# Patient Record
Sex: FEMALE | Race: BLACK OR AFRICAN AMERICAN | NOT HISPANIC OR LATINO | Employment: OTHER | RURAL
[De-identification: names, ages, dates, MRNs, and addresses within clinical notes are randomized per-mention and may not be internally consistent; named-entity substitution may affect disease eponyms.]

---

## 2017-05-19 ENCOUNTER — OFFICE VISIT (OUTPATIENT)
Dept: PODIATRY | Facility: CLINIC | Age: 57
End: 2017-05-19

## 2017-05-19 VITALS — WEIGHT: 198 LBS | HEIGHT: 64 IN | BODY MASS INDEX: 33.8 KG/M2

## 2017-05-19 DIAGNOSIS — M79.672 LEFT FOOT PAIN: Primary | ICD-10-CM

## 2017-05-19 DIAGNOSIS — L85.1 ACQUIRED KERATOSIS OF PLANTAR ASPECT OF FOOT: ICD-10-CM

## 2017-05-19 PROCEDURE — 99202 OFFICE O/P NEW SF 15 MIN: CPT | Performed by: PODIATRIST

## 2017-05-19 RX ORDER — AMLODIPINE BESYLATE AND BENAZEPRIL HYDROCHLORIDE 5; 10 MG/1; MG/1
1 CAPSULE ORAL DAILY
COMMUNITY

## 2017-11-29 ENCOUNTER — OFFICE VISIT (OUTPATIENT)
Dept: OBSTETRICS AND GYNECOLOGY | Facility: CLINIC | Age: 57
End: 2017-11-29

## 2017-11-29 VITALS
WEIGHT: 201 LBS | HEIGHT: 64 IN | BODY MASS INDEX: 34.31 KG/M2 | DIASTOLIC BLOOD PRESSURE: 91 MMHG | SYSTOLIC BLOOD PRESSURE: 132 MMHG

## 2017-11-29 DIAGNOSIS — Z12.31 ENCOUNTER FOR SCREENING MAMMOGRAM FOR BREAST CANCER: ICD-10-CM

## 2017-11-29 DIAGNOSIS — Z01.419 ENCOUNTER FOR WELL WOMAN EXAM WITH ROUTINE GYNECOLOGICAL EXAM: Primary | ICD-10-CM

## 2017-11-29 DIAGNOSIS — B37.31 YEAST VAGINITIS: ICD-10-CM

## 2017-11-29 PROBLEM — I10 HYPERTENSION: Chronic | Status: ACTIVE | Noted: 2017-11-29

## 2017-11-29 PROBLEM — E66.9 OBESITY (BMI 30.0-34.9): Chronic | Status: ACTIVE | Noted: 2017-11-29

## 2017-11-29 PROCEDURE — 87624 HPV HI-RISK TYP POOLED RSLT: CPT | Performed by: OBSTETRICS & GYNECOLOGY

## 2017-11-29 PROCEDURE — 88142 CYTOPATH C/V THIN LAYER: CPT | Performed by: OBSTETRICS & GYNECOLOGY

## 2017-11-29 PROCEDURE — 99386 PREV VISIT NEW AGE 40-64: CPT | Performed by: OBSTETRICS & GYNECOLOGY

## 2017-11-29 RX ORDER — FLUCONAZOLE 150 MG/1
TABLET ORAL
Qty: 2 TABLET | Refills: 11 | Status: SHIPPED | OUTPATIENT
Start: 2017-11-29 | End: 2019-05-03

## 2017-11-30 NOTE — PROGRESS NOTES
Dorothy Erickson is a 57 y.o. y/o female     Chief Complaint: Establish care, annual GYN exam and Pap smear.    HPI:  57-year-old  with one vaginal delivery, one elective , and one miscarriage.  She had a laparoscopic assisted vaginal hysterectomy without BSO in   She is here to establish care and for annual GYN exam and Pap smear.    She is a dentist in private practice.    She is .    She is a nonsmoker.    Past medical history significant for hypertension and obesity.    She does not have any GYN complaints.      She is not on any hormone replacement therapy.    I will request her medical records.    Review of Systems   Constitutional: Negative for activity change, appetite change, chills, diaphoresis, fatigue, fever and unexpected weight change.   Gastrointestinal: Negative for abdominal pain, constipation, diarrhea and nausea.   Genitourinary: Negative for difficulty urinating, dyspareunia, dysuria, pelvic pain, urgency, vaginal bleeding, vaginal discharge and vaginal pain.   Neurological: Negative for headaches.   Psychiatric/Behavioral: Negative for dysphoric mood. The patient is not nervous/anxious.    All other systems reviewed and are negative.     Breast ROS: negative    The following portions of the patient's history were reviewed and updated as appropriate: allergies, current medications, past family history, past medical history, past social history, past surgical history and problem list.    Allergies   Allergen Reactions   • Contrast Dye           Current Outpatient Prescriptions:   •  amLODIPine-benazepril (LOTREL 5-10) 5-10 MG per capsule, Take 1 capsule by mouth Daily., Disp: , Rfl:   •  fluconazole (DIFLUCAN) 150 MG tablet, Take one po today and take one po in 4 days., Disp: 2 tablet, Rfl: 11     The patient has a family history of   Family History   Problem Relation Age of Onset   • Colon cancer Mother         Past Medical History:   Diagnosis Date   • Hypertension    •  "Obesity (BMI 30.0-34.9) 2017        OB History      Para Term  AB Living    3 1   2 1    SAB TAB Ectopic Multiple Live Births    2               Social History     Social History   • Marital status:      Spouse name: N/A   • Number of children: N/A   • Years of education: N/A     Occupational History   • Not on file.     Social History Main Topics   • Smoking status: Never Smoker   • Smokeless tobacco: Never Used   • Alcohol use Yes      Comment: Wine 1 glass a day   • Drug use: No   • Sexual activity: No      Comment: Hysterectomy     Other Topics Concern   • Not on file     Social History Narrative        Past Surgical History:   Procedure Laterality Date   • CYST REMOVAL     • HERNIA REPAIR     • HYSTERECTOMY          Patient Active Problem List   Diagnosis   • Hypertension   • Obesity (BMI 30.0-34.9)        Documented Vitals    17 1322   BP: 132/91   Weight: 201 lb (91.2 kg)   Height: 64\" (162.6 cm)   PainSc: 0-No pain       Physical Exam   Constitutional: She is oriented to person, place, and time. She appears well-developed and well-nourished. No distress.   Obese black female weighing 201 pounds with BMI 34.5.   HENT:   Head: Normocephalic and atraumatic.   Eyes: Conjunctivae and EOM are normal. Pupils are equal, round, and reactive to light.   Neck: Normal range of motion. Neck supple. No JVD present. No tracheal deviation present. No thyromegaly present.   Cardiovascular: Normal rate, regular rhythm, normal heart sounds and intact distal pulses.  Exam reveals no gallop and no friction rub.    No murmur heard.  Pulmonary/Chest: Effort normal and breath sounds normal. No stridor. No respiratory distress. She has no wheezes. She has no rales. She exhibits no tenderness. Right breast exhibits no inverted nipple, no mass, no nipple discharge, no skin change and no tenderness. Left breast exhibits no inverted nipple, no mass, no nipple discharge, no skin change and no tenderness. " Breasts are symmetrical. There is no breast swelling.   Abdominal: Soft. Bowel sounds are normal. She exhibits no distension and no mass. There is no tenderness. There is no rebound and no guarding. No hernia. Hernia confirmed negative in the right inguinal area and confirmed negative in the left inguinal area.   Genitourinary: Rectal exam shows no external hemorrhoid, no internal hemorrhoid, no fissure, no mass, no tenderness, anal tone normal and guaiac negative stool. No breast tenderness, discharge or bleeding. No labial fusion. There is no rash, tenderness, lesion or injury on the right labia. There is no rash, tenderness, lesion or injury on the left labia. Right adnexum displays no mass, no tenderness and no fullness. Left adnexum displays no mass, no tenderness and no fullness. Vaginal discharge found.   Genitourinary Comments: Vaginal yeast infection.  Surgically absent uterus and cervix.  No adnexal masses palpated.  Pap smear of the vaginal cuff performed.     Musculoskeletal: Normal range of motion. She exhibits no edema, tenderness or deformity.   Lymphadenopathy:     She has no cervical adenopathy.        Right: No inguinal adenopathy present.        Left: No inguinal adenopathy present.   Neurological: She is alert and oriented to person, place, and time. She has normal reflexes. She displays normal reflexes. No cranial nerve deficit. She exhibits normal muscle tone. Coordination normal.   Skin: Skin is warm and dry. No rash noted. She is not diaphoretic. No erythema. No pallor.   Psychiatric: She has a normal mood and affect. Her behavior is normal. Judgment and thought content normal.   Nursing note and vitals reviewed.     Assessment        Diagnosis Plan   1. Encounter for well woman exam with routine gynecological exam  Liquid-based Pap Smear, Screening - ThinPrep Vial, Cervix   2. Encounter for screening mammogram for breast cancer  Mammo Screening Digital Tomosynthesis Bilateral With CAD   3.  Yeast vaginitis           Plan      1. Diflucan 150 mg by mouth today and repeat in 4 days.  2. Schedule mammogram.  3. Encouraged in diet and exercise.  4. Handouts on depression, hot flashes, exercise, and vitamin use.   5. Follow-up in 1 year.  Follow-up sooner as needed.            This document has been electronically signed by Sabas Matthews MD on November 29, 2017 8:17 PM

## 2017-12-01 LAB
LAB AP CASE REPORT: NORMAL
LAB AP GYN ADDITIONAL INFORMATION: NORMAL
Lab: NORMAL
PATH INTERP SPEC-IMP: NORMAL
STAT OF ADQ CVX/VAG CYTO-IMP: NORMAL

## 2017-12-05 LAB — HPV I/H RISK 4 DNA CVX QL PROBE+SIG AMP: NEGATIVE

## 2019-05-03 ENCOUNTER — OFFICE VISIT (OUTPATIENT)
Dept: GASTROENTEROLOGY | Facility: CLINIC | Age: 59
End: 2019-05-03

## 2019-05-03 VITALS
DIASTOLIC BLOOD PRESSURE: 92 MMHG | BODY MASS INDEX: 34.18 KG/M2 | HEART RATE: 86 BPM | HEIGHT: 64 IN | SYSTOLIC BLOOD PRESSURE: 148 MMHG | WEIGHT: 200.2 LBS

## 2019-05-03 DIAGNOSIS — Z80.0 FH: COLON CANCER: ICD-10-CM

## 2019-05-03 DIAGNOSIS — Z12.11 ENCOUNTER FOR SCREENING FOR MALIGNANT NEOPLASM OF COLON: Primary | ICD-10-CM

## 2019-05-03 PROCEDURE — S0285 CNSLT BEFORE SCREEN COLONOSC: HCPCS | Performed by: NURSE PRACTITIONER

## 2019-05-03 RX ORDER — SODIUM, POTASSIUM,MAG SULFATES 17.5-3.13G
1 SOLUTION, RECONSTITUTED, ORAL ORAL EVERY 12 HOURS
Qty: 2 BOTTLE | Refills: 0 | Status: ON HOLD | OUTPATIENT
Start: 2019-05-03 | End: 2019-06-07

## 2019-05-03 RX ORDER — DEXTROSE AND SODIUM CHLORIDE 5; .45 G/100ML; G/100ML
30 INJECTION, SOLUTION INTRAVENOUS CONTINUOUS PRN
Status: CANCELLED | OUTPATIENT
Start: 2019-06-07

## 2019-05-03 RX ORDER — DIPHENHYDRAMINE HCL 25 MG
25 CAPSULE ORAL EVERY 6 HOURS PRN
COMMUNITY
End: 2019-06-05

## 2019-05-03 RX ORDER — SACCHAROMYCES BOULARDII 250 MG
250 CAPSULE ORAL 2 TIMES DAILY
COMMUNITY
End: 2019-06-05

## 2019-05-03 NOTE — PROGRESS NOTES
Chief Complaint   Patient presents with   • Colon Cancer Screening       Subjective    Dorothy Erickson is a 58 y.o. female. she is here today for follow-up.    History of Present Illness  58-year-old female presents to discuss screening colonoscopy.  She denies any abdominal pain, nausea, vomiting or change within her bowel habits.  Has family history of colorectal cancer in her mother who was diagnosed in her 60s.  States she had normal screening colonoscopy about 10 years ago in [place] unable to review that result.  Reports her sister has had polyps.  Plan; schedule patient for screening colonoscopy for surveillance due to family history of colon cancer.        The following portions of the patient's history were reviewed and updated as appropriate:   Past Medical History:   Diagnosis Date   • Hypertension    • Obesity (BMI 30.0-34.9) 2017     Past Surgical History:   Procedure Laterality Date   • CYST REMOVAL     • HERNIA REPAIR     • HYSTERECTOMY       Family History   Problem Relation Age of Onset   • Colon cancer Mother      OB History      Para Term  AB Living    3 1 1   2 1    SAB TAB Ectopic Molar Multiple Live Births    2                  Prior to Admission medications    Medication Sig Start Date End Date Taking? Authorizing Provider   amLODIPine-benazepril (LOTREL 5-10) 5-10 MG per capsule Take 1 capsule by mouth Daily.   Yes Shelia Woods MD   diphenhydrAMINE (BENADRYL) 25 mg capsule Take 25 mg by mouth Every 6 (Six) Hours As Needed for Itching.   Yes Shelia Woods MD   Multiple Vitamins-Minerals (MULTIVITAMIN ADULT PO) multivitamin   Yes Shelia Woods MD   saccharomyces boulardii (FLORASTOR) 250 MG capsule Take 250 mg by mouth 2 (Two) Times a Day.   Yes Shelia Woods MD   fluconazole (DIFLUCAN) 150 MG tablet Take one po today and take one po in 4 days. 11/29/17 5/3/19  Sabas Matthews MD     Allergies   Allergen Reactions   • Contrast Dye      Social  "History     Socioeconomic History   • Marital status:      Spouse name: Not on file   • Number of children: Not on file   • Years of education: Not on file   • Highest education level: Not on file   Tobacco Use   • Smoking status: Never Smoker   • Smokeless tobacco: Never Used   Substance and Sexual Activity   • Alcohol use: Yes     Comment: Wine 1 glass a day   • Drug use: No   • Sexual activity: No     Birth control/protection: None     Comment: Hysterectomy       Review of Systems  Review of Systems   Constitutional: Negative for activity change, appetite change, chills, diaphoresis, fatigue, fever and unexpected weight change.   HENT: Negative for sore throat and trouble swallowing.    Respiratory: Negative for shortness of breath.    Gastrointestinal: Negative for abdominal distention, abdominal pain, anal bleeding, blood in stool, constipation, diarrhea, nausea, rectal pain and vomiting.   Musculoskeletal: Negative for arthralgias.   Skin: Negative for pallor.   Neurological: Negative for light-headedness.        /92 (BP Location: Left arm)   Pulse 86   Ht 162.6 cm (64\")   Wt 90.8 kg (200 lb 3.2 oz)   LMP  (LMP Unknown) Comment: 1993  BMI 34.36 kg/m²     Objective    Physical Exam   Constitutional: She is oriented to person, place, and time. She appears well-developed and well-nourished. She is cooperative. No distress.   HENT:   Head: Normocephalic and atraumatic.   Neck: Normal range of motion. Neck supple. No thyromegaly present.   Cardiovascular: Normal rate, regular rhythm and normal heart sounds.   Pulmonary/Chest: Effort normal and breath sounds normal. She has no wheezes. She has no rhonchi. She has no rales.   Abdominal: Soft. Normal appearance and bowel sounds are normal. She exhibits no distension. There is no hepatosplenomegaly. There is no tenderness. There is no rigidity and no guarding. No hernia.   Lymphadenopathy:     She has no cervical adenopathy.   Neurological: She is " alert and oriented to person, place, and time.   Skin: Skin is warm, dry and intact. No rash noted. No pallor.   Psychiatric: She has a normal mood and affect. Her speech is normal.     Office Visit on 11/29/2017   Component Date Value Ref Range Status   • Case Report 11/29/2017    Final                    Value:Gynecologic Cytology Report                       Case: [id number]                                  Authorizing Provider:  Sabas Matthews MD           Collected:           11/29/2017 01:33 PM          Ordering Location:     [facility]     Received:            11/29/2017 01:33 PM                                 [facility]                                                                 First Screen:          Ligia Vides                                                              Specimen:    Liquid-Based Pap, Screening, Vagina                                                       • Interpretation 11/29/2017 Negative for intraepithelial lesion or malignancy    Final   • Specimen Adequacy 11/29/2017 Satisfactory for evaluation   Final   • Additional Information 11/29/2017    Final                    Value:This result contains rich text formatting which cannot be displayed here.   • HPV Aptima 11/29/2017 Negative  Negative Final    This test detects fourteen high-risk HPV types (16/18/31/33/35/39/45/  51/52/56/58/59/66/68) without differentiation.     Assessment/Plan      1. Encounter for screening for malignant neoplasm of colon    2. FH: colon cancer    .       Orders placed during this encounter include:  Orders Placed This Encounter   Procedures   • Follow Anesthesia Guidelines / Standing Orders     Standing Status:   Future   • Obtain Informed Consent     Standing Status:   Future     Order Specific Question:   Informed Consent Given For     Answer:   COLONOSCOPY       COLONOSCOPY Friday (N/A)    Review and/or summary of lab tests, radiology, procedures, medications. Review and summary of  old records and obtaining of history. The risks and benefits of my recommendations, as well as other treatment options were discussed with the patient today. Questions were answered.    New Medications Ordered This Visit   Medications   • sodium-potassium-magnesium sulfates (SUPREP BOWEL PREP KIT) 17.5-3.13-1.6 GM/177ML solution oral solution     Sig: Take 1 bottle by mouth Every 12 (Twelve) Hours.     Dispense:  2 bottle     Refill:  0       Follow-up: Return in about 4 weeks (around 5/31/2019).          This document has been electronically signed by LIS Rojas on May 3, 2019 7:53 AM             Results for orders placed or performed in visit on 11/29/17   HPV DNA Probe, Direct - ThinPrep Vial, Vagina   Result Value Ref Range    HPV Aptima Negative Negative   Liquid-based Pap Smear, Screening - ThinPrep Vial, Cervix   Result Value Ref Range    Case Report       Gynecologic Cytology Report                       Case: [id number]                                  Authorizing Provider:  Sabas Matthews MD           Collected:           11/29/2017 01:33 PM          Ordering Location:     [facility]     Received:            11/29/2017 01:33 PM                                 GROUP OB GYN                                                                 First Screen:          Ligia Vides                                                              Specimen:    Liquid-Based Pap, Screening, Vagina                                                        Interpretation Negative for intraepithelial lesion or malignancy      Specimen Adequacy Satisfactory for evaluation     Additional Information       Disclaimer: Cervical cytology is a screening test primarily for squamous cancer and its precursors and has associated false-negative and false-positive results.  Technologies such as liquid-based preparations may decrease but will not eliminate all false-negative results.  Follow-up of unexplained clinical  signs and symptoms is recommended to minimize false-negative results. (The [place] System for Reporting Cervical Cytology: [place]otts, 2015).      Embedded Images

## 2019-06-07 ENCOUNTER — ANESTHESIA (OUTPATIENT)
Dept: GASTROENTEROLOGY | Facility: HOSPITAL | Age: 59
End: 2019-06-07

## 2019-06-07 ENCOUNTER — HOSPITAL ENCOUNTER (OUTPATIENT)
Facility: HOSPITAL | Age: 59
Setting detail: HOSPITAL OUTPATIENT SURGERY
Discharge: HOME | End: 2019-06-07
Attending: INTERNAL MEDICINE | Admitting: INTERNAL MEDICINE

## 2019-06-07 ENCOUNTER — ANESTHESIA EVENT (OUTPATIENT)
Dept: GASTROENTEROLOGY | Facility: HOSPITAL | Age: 59
End: 2019-06-07

## 2019-06-07 VITALS
RESPIRATION RATE: 20 BRPM | BODY MASS INDEX: 33.12 KG/M2 | DIASTOLIC BLOOD PRESSURE: 76 MMHG | OXYGEN SATURATION: 100 % | SYSTOLIC BLOOD PRESSURE: 138 MMHG | TEMPERATURE: 96.8 F | HEIGHT: 64 IN | WEIGHT: 194 LBS | HEART RATE: 72 BPM

## 2019-06-07 DIAGNOSIS — Z12.11 ENCOUNTER FOR SCREENING FOR MALIGNANT NEOPLASM OF COLON: ICD-10-CM

## 2019-06-07 DIAGNOSIS — Z80.0 FH: COLON CANCER: ICD-10-CM

## 2019-06-07 PROCEDURE — 25010000002 PROPOFOL 10 MG/ML EMULSION: Performed by: NURSE ANESTHETIST, CERTIFIED REGISTERED

## 2019-06-07 PROCEDURE — 88305 TISSUE EXAM BY PATHOLOGIST: CPT | Performed by: PATHOLOGY

## 2019-06-07 PROCEDURE — 45385 COLONOSCOPY W/LESION REMOVAL: CPT | Performed by: INTERNAL MEDICINE

## 2019-06-07 PROCEDURE — 25010000002 MIDAZOLAM PER 1 MG: Performed by: NURSE ANESTHETIST, CERTIFIED REGISTERED

## 2019-06-07 PROCEDURE — 88305 TISSUE EXAM BY PATHOLOGIST: CPT | Performed by: INTERNAL MEDICINE

## 2019-06-07 RX ORDER — MIDAZOLAM HYDROCHLORIDE 1 MG/ML
INJECTION INTRAMUSCULAR; INTRAVENOUS AS NEEDED
Status: DISCONTINUED | OUTPATIENT
Start: 2019-06-07 | End: 2019-06-07 | Stop reason: SURG

## 2019-06-07 RX ORDER — LIDOCAINE HYDROCHLORIDE 20 MG/ML
INJECTION, SOLUTION INTRAVENOUS AS NEEDED
Status: DISCONTINUED | OUTPATIENT
Start: 2019-06-07 | End: 2019-06-07 | Stop reason: SURG

## 2019-06-07 RX ORDER — PROPOFOL 10 MG/ML
VIAL (ML) INTRAVENOUS AS NEEDED
Status: DISCONTINUED | OUTPATIENT
Start: 2019-06-07 | End: 2019-06-07 | Stop reason: SURG

## 2019-06-07 RX ORDER — DEXTROSE AND SODIUM CHLORIDE 5; .45 G/100ML; G/100ML
30 INJECTION, SOLUTION INTRAVENOUS CONTINUOUS PRN
Status: DISCONTINUED | OUTPATIENT
Start: 2019-06-07 | End: 2019-06-07 | Stop reason: HOSPADM

## 2019-06-07 RX ADMIN — PROPOFOL 100 MG: 10 INJECTION, EMULSION INTRAVENOUS at 16:53

## 2019-06-07 RX ADMIN — MIDAZOLAM HYDROCHLORIDE 2 MG: 2 INJECTION, SOLUTION INTRAMUSCULAR; INTRAVENOUS at 16:50

## 2019-06-07 RX ADMIN — DEXTROSE AND SODIUM CHLORIDE 30 ML/HR: 5; 450 INJECTION, SOLUTION INTRAVENOUS at 15:33

## 2019-06-07 RX ADMIN — PROPOFOL 30 MG: 10 INJECTION, EMULSION INTRAVENOUS at 17:02

## 2019-06-07 RX ADMIN — PROPOFOL 30 MG: 10 INJECTION, EMULSION INTRAVENOUS at 16:55

## 2019-06-07 RX ADMIN — DEXTROSE AND SODIUM CHLORIDE: 5; 450 INJECTION, SOLUTION INTRAVENOUS at 16:47

## 2019-06-07 RX ADMIN — LIDOCAINE HYDROCHLORIDE 100 MG: 20 INJECTION, SOLUTION INTRAVENOUS at 16:53

## 2019-06-07 RX ADMIN — PROPOFOL 20 MG: 10 INJECTION, EMULSION INTRAVENOUS at 17:04

## 2019-06-07 RX ADMIN — PROPOFOL 20 MG: 10 INJECTION, EMULSION INTRAVENOUS at 17:00

## 2019-06-07 RX ADMIN — PROPOFOL 10 MG: 10 INJECTION, EMULSION INTRAVENOUS at 16:57

## 2019-06-07 NOTE — H&P
No chief complaint on file.      Subjective    Dorothy Erickson is a 58 y.o. female. she is here today for follow-up.    History of Present Illness  58-year-old female presents to discuss screening colonoscopy.  She denies any abdominal pain, nausea, vomiting or change within her bowel habits.  Has family history of colorectal cancer in her mother who was diagnosed in her 60s.  States she had normal screening colonoscopy about 10 years ago in [place] unable to review that result.  Reports her sister has had polyps.  Plan; schedule patient for screening colonoscopy for surveillance due to family history of colon cancer.        The following portions of the patient's history were reviewed and updated as appropriate:   Past Medical History:   Diagnosis Date   • Hypertension    • Obesity (BMI 30.0-34.9) 2017     Past Surgical History:   Procedure Laterality Date   • CYST REMOVAL     • HERNIA REPAIR     • HYSTERECTOMY       Family History   Problem Relation Age of Onset   • Colon cancer Mother      OB History      Para Term  AB Living    3 1 1   2 1    SAB TAB Ectopic Molar Multiple Live Births    2                  Prior to Admission medications    Medication Sig Start Date End Date Taking? Authorizing Provider   amLODIPine-benazepril (LOTREL 5-10) 5-10 MG per capsule Take 1 capsule by mouth Daily.   Yes Shelia Woods MD   diphenhydrAMINE (BENADRYL) 25 mg capsule Take 25 mg by mouth Every 6 (Six) Hours As Needed for Itching.   Yes Shelia Woods MD   Multiple Vitamins-Minerals (MULTIVITAMIN ADULT PO) multivitamin   Yes Shelia Woods MD   saccharomyces boulardii (FLORASTOR) 250 MG capsule Take 250 mg by mouth 2 (Two) Times a Day.   Yes Shelia Woods MD   fluconazole (DIFLUCAN) 150 MG tablet Take one po today and take one po in 4 days. 11/29/17 5/3/19  Sabas Matthews MD     Allergies   Allergen Reactions   • Contrast Dye Swelling     Social History  "    Socioeconomic History   • Marital status: Single     Spouse name: Not on file   • Number of children: Not on file   • Years of education: Not on file   • Highest education level: Not on file   Tobacco Use   • Smoking status: Never Smoker   • Smokeless tobacco: Never Used   Substance and Sexual Activity   • Alcohol use: Yes     Comment: Wine 1 glass a day   • Drug use: No   • Sexual activity: No     Birth control/protection: None     Comment: Hysterectomy       Review of Systems  Review of Systems   Constitutional: Negative for activity change, appetite change, chills, diaphoresis, fatigue, fever and unexpected weight change.   HENT: Negative for sore throat and trouble swallowing.    Respiratory: Negative for shortness of breath.    Gastrointestinal: Negative for abdominal distention, abdominal pain, anal bleeding, blood in stool, constipation, diarrhea, nausea, rectal pain and vomiting.   Musculoskeletal: Negative for arthralgias.   Skin: Negative for pallor.   Neurological: Negative for light-headedness.        Ht 162.6 cm (64\")   Wt 89.4 kg (197 lb)   LMP  (LMP Unknown) Comment: 1993  BMI 33.81 kg/m²     Objective    Physical Exam   Constitutional: She is oriented to person, place, and time. She appears well-developed and well-nourished. She is cooperative. No distress.   HENT:   Head: Normocephalic and atraumatic.   Neck: Normal range of motion. Neck supple. No thyromegaly present.   Cardiovascular: Normal rate, regular rhythm and normal heart sounds.   Pulmonary/Chest: Effort normal and breath sounds normal. She has no wheezes. She has no rhonchi. She has no rales.   Abdominal: Soft. Normal appearance and bowel sounds are normal. She exhibits no distension. There is no hepatosplenomegaly. There is no tenderness. There is no rigidity and no guarding. No hernia.   Lymphadenopathy:     She has no cervical adenopathy.   Neurological: She is alert and oriented to person, place, and time.   Skin: Skin is warm, " dry and intact. No rash noted. No pallor.   Psychiatric: She has a normal mood and affect. Her speech is normal.     Office Visit on 11/29/2017   Component Date Value Ref Range Status   • Case Report 11/29/2017    Final                    Value:Gynecologic Cytology Report                       Case: [id number]                                  Authorizing Provider:  Sabas Matthews MD           Collected:           11/29/2017 01:33 PM          Ordering Location[facility]     [facility]     Received:            11/29/2017 01:33 PM                                 GROUP [facility] GYN                                                                 First Screen:          Ligia Vides                                                              Specimen:    Liquid-Based Pap, Screening, Vagina                                                       • Interpretation 11/29/2017 Negative for intraepithelial lesion or malignancy    Final   • Specimen Adequacy 11/29/2017 Satisfactory for evaluation   Final   • Additional Information 11/29/2017    Final                    Value:This result contains rich text formatting which cannot be displayed here.   • HPV Aptima 11/29/2017 Negative  Negative Final    This test detects fourteen high-risk HPV types (16/18/31/33/35/39/45/  51/52/56/58/59/66/68) without differentiation.     Assessment/Plan      No diagnosis found..       Orders placed during this encounter include:  No orders of the defined types were placed in this encounter.      COLONOSCOPY Friday (N/A)    Review and/or summary of lab tests, radiology, procedures, medications. Review and summary of old records and obtaining of history. The risks and benefits of my recommendations, as well as other treatment options were discussed with the patient today. Questions were answered.    No orders of the defined types were placed in this encounter.      Follow-up: No Follow-up on file.          This document has been electronically signed  by Sabas Fang MD on June 7, 2019 2:54 PM             Results for orders placed or performed in visit on 11/29/17   HPV DNA Probe, Direct - ThinPrep Vial, Vagina   Result Value Ref Range    HPV Aptima Negative Negative   Liquid-based Pap Smear, Screening - ThinPrep Vial, Cervix   Result Value Ref Range    Case Report       Gynecologic Cytology Report                       Case: [id number]                                  Authorizing Provider:  Sabas Matthews MD           Collected:           11/29/2017 01:33 PM          Ordering Location:     [facility]     Received:            11/29/2017 01:33 PM                                 GROUP OB GYN                                                                 First Screen:          Ligia Vides                                                              Specimen:    Liquid-Based Pap, Screening, Vagina                                                        Interpretation Negative for intraepithelial lesion or malignancy      Specimen Adequacy Satisfactory for evaluation     Additional Information       Disclaimer: Cervical cytology is a screening test primarily for squamous cancer and its precursors and has associated false-negative and false-positive results.  Technologies such as liquid-based preparations may decrease but will not eliminate all false-negative results.  Follow-up of unexplained clinical signs and symptoms is recommended to minimize false-negative results. (The [place] System for Reporting Cervical Cytology: Potts, 2015).      Embedded Images

## 2019-06-07 NOTE — ANESTHESIA PREPROCEDURE EVALUATION
Anesthesia Evaluation     Patient summary reviewed and Nursing notes reviewed   NPO Solid Status: > 8 hours  NPO Liquid Status: > 4 hours           Airway   Mallampati: II  TM distance: >3 FB  Neck ROM: full  No difficulty expected  Dental - normal exam     Pulmonary - normal exam   Cardiovascular - normal exam    (+) hypertension well controlled less than 2 medications,       Neuro/Psych  GI/Hepatic/Renal/Endo      Musculoskeletal     Abdominal  - normal exam   Substance History      OB/GYN          Other                      Anesthesia Plan    ASA 2     MAC     intravenous induction   Anesthetic plan, all risks, benefits, and alternatives have been provided, discussed and informed consent has been obtained with: patient.

## 2019-06-07 NOTE — ANESTHESIA POSTPROCEDURE EVALUATION
Patient: Dorothy Erickson    Procedure Summary     Date:  06/07/19 Room / Location:  [facility] ENDOSCOPY 1 / [facility] ENDOSCOPY    Anesthesia Start:  1652 Anesthesia Stop:      Procedure:  COLONOSCOPY Friday (N/A ) Diagnosis:       Encounter for screening for malignant neoplasm of colon      FH: colon cancer      (Encounter for screening for malignant neoplasm of colon [Z12.11])      (FH: colon cancer [Z80.0])    Surgeon:  Sabas Fang MD Provider:  Mira Nava CRNA    Anesthesia Type:  MAC ASA Status:  2          Anesthesia Type: MAC  Last vitals  BP       Temp   97.2 °F (36.2 °C) (06/07/19 1513)   Pulse   68 (06/07/19 1513)   Resp   18 (06/07/19 1513)     SpO2   98 % (06/07/19 1513)     Post Anesthesia Care and Evaluation    Patient location during evaluation: bedside  Patient participation: waiting for patient participation  Level of consciousness: sleepy but conscious  Pain score: 0  Pain management: adequate  Airway patency: patent  Anesthetic complications: No anesthetic complications  PONV Status: none  Cardiovascular status: acceptable  Respiratory status: acceptable  Hydration status: acceptable

## 2019-06-10 LAB
LAB AP CASE REPORT: NORMAL
PATH REPORT.FINAL DX SPEC: NORMAL
PATH REPORT.GROSS SPEC: NORMAL

## 2022-08-08 ENCOUNTER — OFFICE VISIT (OUTPATIENT)
Dept: GASTROENTEROLOGY | Facility: CLINIC | Age: 62
End: 2022-08-08

## 2022-08-08 VITALS
DIASTOLIC BLOOD PRESSURE: 83 MMHG | BODY MASS INDEX: 35.44 KG/M2 | HEART RATE: 68 BPM | WEIGHT: 207.6 LBS | HEIGHT: 64 IN | SYSTOLIC BLOOD PRESSURE: 170 MMHG

## 2022-08-08 DIAGNOSIS — Z86.010 ENCOUNTER FOR COLONOSCOPY DUE TO HISTORY OF ADENOMATOUS COLONIC POLYPS: ICD-10-CM

## 2022-08-08 DIAGNOSIS — Z80.0 FH: COLON CANCER: Primary | ICD-10-CM

## 2022-08-08 DIAGNOSIS — Z12.11 ENCOUNTER FOR COLONOSCOPY DUE TO HISTORY OF ADENOMATOUS COLONIC POLYPS: ICD-10-CM

## 2022-08-08 PROBLEM — I10 BENIGN ESSENTIAL HYPERTENSION: Status: ACTIVE | Noted: 2022-08-08

## 2022-08-08 PROCEDURE — S0285 CNSLT BEFORE SCREEN COLONOSC: HCPCS | Performed by: NURSE PRACTITIONER

## 2022-08-08 RX ORDER — DEXTROSE AND SODIUM CHLORIDE 5; .45 G/100ML; G/100ML
30 INJECTION, SOLUTION INTRAVENOUS CONTINUOUS PRN
Status: CANCELLED | OUTPATIENT
Start: 2022-09-09

## 2022-08-08 RX ORDER — SODIUM, POTASSIUM,MAG SULFATES 17.5-3.13G
1 SOLUTION, RECONSTITUTED, ORAL ORAL EVERY 12 HOURS
Qty: 354 ML | Refills: 0 | Status: SHIPPED | OUTPATIENT
Start: 2022-08-08

## 2022-08-08 NOTE — PROGRESS NOTES
Chief Complaint   Patient presents with   • Colon Cancer Screening       Subjective    Dorothy Erickson is a 61 y.o. female. she is here today for follow-up.    History of Present Illness  61-year-old female presents to discuss screening colonoscopy.  She denies any abdominal pain nausea vomiting or changes in her bowel habits.  She has family history of colorectal cancer in her mother who was diagnosed in her 60s.  Prior colonoscopy in 2019 noted adenomatous colonic polyp of ascending colon with repeat recommended in 3 years for surveillance.  Plan; schedule patient for screening colonoscopy due to history of adenomatous colonic polyp of ascending colon and family history of colon cancer in her mother       The following portions of the patient's history were reviewed and updated as appropriate:   Past Medical History:   Diagnosis Date   • Hypertension    • Obesity (BMI 30.0-34.9) 2017     Past Surgical History:   Procedure Laterality Date   • COLONOSCOPY N/A 2019    Procedure: COLONOSCOPY Friday;  Surgeon: Sabas Fang MD;  Location: [facility] ENDOSCOPY;  Service: Gastroenterology   • CYST REMOVAL     • HERNIA REPAIR     • HYSTERECTOMY       Family History   Problem Relation Age of Onset   • Colon cancer Mother      OB History        3    Para   1    Term   1            AB   2    Living   1       SAB   2    IAB        Ectopic        Molar        Multiple        Live Births                  Prior to Admission medications    Medication Sig Start Date End Date Taking? Authorizing Provider   amLODIPine-benazepril (LOTREL 5-10) 5-10 MG per capsule Take 1 capsule by mouth Daily.    Provider, Shelia, MD     Allergies   Allergen Reactions   • Contrast Dye Swelling     Social History     Socioeconomic History   • Marital status: Single   Tobacco Use   • Smoking status: Never Smoker   • Smokeless tobacco: Never Used   Substance and Sexual Activity   • Alcohol use: Yes     Comment: Wine 1 glass  "a day   • Drug use: No   • Sexual activity: Never     Birth control/protection: None     Comment: Hysterectomy       Review of Systems  Review of Systems   Constitutional: Negative for activity change, appetite change, chills, diaphoresis, fatigue, fever and unexpected weight change.   HENT: Negative for sore throat and trouble swallowing.    Respiratory: Negative for shortness of breath.    Gastrointestinal: Negative for abdominal distention, abdominal pain, anal bleeding, blood in stool, constipation, diarrhea, nausea, rectal pain and vomiting.   Musculoskeletal: Negative for arthralgias.   Skin: Negative for pallor.   Neurological: Negative for light-headedness.        /83 (BP Location: Left arm)   Pulse 68   Ht 162.6 cm (64\")   Wt 94.2 kg (207 lb 9.6 oz)   LMP  (LMP Unknown) Comment: 1993  BMI 35.63 kg/m²     Objective    Physical Exam  Constitutional:       General: She is not in acute distress.     Appearance: Normal appearance. She is normal weight. She is not ill-appearing.   HENT:      Head: Normocephalic and atraumatic.   Pulmonary:      Effort: Pulmonary effort is normal.   Abdominal:      General: Abdomen is flat. Bowel sounds are normal. There is no distension.      Palpations: Abdomen is soft. There is no mass.      Tenderness: There is no abdominal tenderness.   Neurological:      Mental Status: She is alert.       Admission on 06/07/2019, Discharged on 06/07/2019   Component Date Value Ref Range Status   • Case Report 06/07/2019    Final                    Value:Surgical Pathology Report                         Case: [id number]                                  Authorizing Provider:  Sabas Fang MD        Collected:           06/07/2019 05:10 PM          Ordering Location:     [facility]             Received:            06/08/2019 08:34 AM                                 [facility]                                                     Pathologist:           Syeda" Bill MARR MD                                                         Specimen:    Large Intestine, Right / Ascending Colon, polyp                                           • Final Diagnosis 06/07/2019    Final                    Value:This result contains rich text formatting which cannot be displayed here.   • Gross Description 06/07/2019    Final                    Value:This result contains rich text formatting which cannot be displayed here.     Assessment & Plan      1. FH: colon cancer    2. Encounter for colonoscopy due to history of adenomatous colonic polyps    .       Orders placed during this encounter include:  Orders Placed This Encounter   Procedures   • Follow Anesthesia Guidelines / Standing Orders     Standing Status:   Future   • Obtain Informed Consent     Standing Status:   Future     Order Specific Question:   Informed Consent Given For     Answer:   COLONOSCOPY       COLONOSCOPY (N/A)    Review and/or summary of lab tests, radiology, procedures, medications. Review and summary of old records and obtaining of history. The risks and benefits of my recommendations, as well as other treatment options were discussed with the patient today. Questions were answered.    New Medications Ordered This Visit   Medications   • sodium-potassium-magnesium sulfates (Suprep Bowel Prep Kit) 17.5-3.13-1.6 GM/177ML solution oral solution     Sig: Take 1 bottle by mouth Every 12 (Twelve) Hours.     Dispense:  354 mL     Refill:  0       Follow-up: No follow-ups on file.          This document has been electronically signed by LIS Rojas on August 8, 2022 15:29 CDT           I spent 7 minutes caring for Dorothy on this date of service. This time includes time spent by me in the following activities:preparing for the visit, reviewing tests, obtaining and/or reviewing a separately obtained history, performing a medically appropriate examination and/or evaluation , counseling and educating the  "patient/family/caregiver, ordering medications, tests, or procedures, referring and communicating with other health care professionals , documenting information in the medical record and care coordination    Results for orders placed or performed during the hospital encounter of 06/07/19   Tissue Pathology Exam    Specimen: Large Intestine, Right / Ascending Colon; Polyp   Result Value Ref Range    Case Report       Surgical Pathology Report                         Case: [id number]                                  Authorizing Provider:  Sabas Fang MD        Collected:           06/07/2019 05:10 PM          Ordering Location:     [facility]             Received:            06/08/2019 08:34 AM                                 [place] [facility]                                                     Pathologist:           Bill Landa MD                                                         Specimen:    Large Intestine, Right / Ascending Colon, polyp                                            Final Diagnosis       POLYP, ASCENDING COLON:   TUBULAR ADENOMA.       Gross Description       The container is labeled \"polyp, ascending colon\" and has nodular bits of brown white material 0.5 cc in aggregate. The entire specimen is embedded as 1A.     Results for orders placed or performed in visit on 11/29/17   HPV DNA Probe, Direct - ThinPrep Vial, Vagina    Specimen: Vagina; ThinPrep Vial   Result Value Ref Range    HPV Aptima Negative Negative   Liquid-based Pap Smear, Screening - ThinPrep Vial, Cervix    Specimen: Vagina; ThinPrep Vial   Result Value Ref Range    Case Report       Gynecologic Cytology Report                       [id number]: [id number]                                  Authorizing Provider:  Sabas Matthews MD           Collected:           11/29/2017 01:33 PM          Ordering Location:     [facility]     Received:            11/29/2017 01:33 PM                                 GROUP " OB GYN                                                                 First Screen:          Ligia Vides                                                              Specimen:    Liquid-Based Pap, Screening, Vagina                                                        Interpretation Negative for intraepithelial lesion or malignancy      Specimen Adequacy Satisfactory for evaluation     Additional Information       Disclaimer: Cervical cytology is a screening test primarily for squamous cancer and its precursors and has associated false-negative and false-positive results.  Technologies such as liquid-based preparations may decrease but will not eliminate all false-negative results.  Follow-up of unexplained clinical signs and symptoms is recommended to minimize false-negative results. (The [place] System for Reporting Cervical Cytology: [place], 2015).      Embedded Images

## 2022-09-08 ENCOUNTER — ANESTHESIA EVENT (OUTPATIENT)
Dept: GASTROENTEROLOGY | Facility: HOSPITAL | Age: 62
End: 2022-09-08

## 2022-09-09 ENCOUNTER — HOSPITAL ENCOUNTER (OUTPATIENT)
Facility: HOSPITAL | Age: 62
Setting detail: HOSPITAL OUTPATIENT SURGERY
Discharge: HOME | End: 2022-09-09
Attending: INTERNAL MEDICINE | Admitting: INTERNAL MEDICINE

## 2022-09-09 ENCOUNTER — ANESTHESIA (OUTPATIENT)
Dept: GASTROENTEROLOGY | Facility: HOSPITAL | Age: 62
End: 2022-09-09

## 2022-09-09 VITALS
HEART RATE: 60 BPM | WEIGHT: 201 LBS | BODY MASS INDEX: 34.31 KG/M2 | HEIGHT: 64 IN | DIASTOLIC BLOOD PRESSURE: 69 MMHG | SYSTOLIC BLOOD PRESSURE: 136 MMHG | RESPIRATION RATE: 20 BRPM | TEMPERATURE: 97 F | OXYGEN SATURATION: 100 %

## 2022-09-09 DIAGNOSIS — Z80.0 FH: COLON CANCER: ICD-10-CM

## 2022-09-09 DIAGNOSIS — Z86.010 ENCOUNTER FOR COLONOSCOPY DUE TO HISTORY OF ADENOMATOUS COLONIC POLYPS: ICD-10-CM

## 2022-09-09 DIAGNOSIS — Z12.11 ENCOUNTER FOR COLONOSCOPY DUE TO HISTORY OF ADENOMATOUS COLONIC POLYPS: ICD-10-CM

## 2022-09-09 PROCEDURE — 25010000002 PROPOFOL 10 MG/ML EMULSION: Performed by: NURSE ANESTHETIST, CERTIFIED REGISTERED

## 2022-09-09 PROCEDURE — 45378 DIAGNOSTIC COLONOSCOPY: CPT | Performed by: INTERNAL MEDICINE

## 2022-09-09 RX ORDER — PROPOFOL 10 MG/ML
VIAL (ML) INTRAVENOUS AS NEEDED
Status: DISCONTINUED | OUTPATIENT
Start: 2022-09-09 | End: 2022-09-09 | Stop reason: SURG

## 2022-09-09 RX ORDER — DEXTROSE AND SODIUM CHLORIDE 5; .45 G/100ML; G/100ML
30 INJECTION, SOLUTION INTRAVENOUS CONTINUOUS PRN
Status: DISCONTINUED | OUTPATIENT
Start: 2022-09-09 | End: 2022-09-09 | Stop reason: HOSPADM

## 2022-09-09 RX ADMIN — DEXTROSE AND SODIUM CHLORIDE 30 ML/HR: 5; 450 INJECTION, SOLUTION INTRAVENOUS at 08:47

## 2022-09-09 RX ADMIN — PROPOFOL 50 MG: 10 INJECTION, EMULSION INTRAVENOUS at 09:43

## 2022-09-09 RX ADMIN — PROPOFOL 100 MG: 10 INJECTION, EMULSION INTRAVENOUS at 09:38

## 2022-09-09 NOTE — H&P
No chief complaint on file.    I agree with the current note with no changes in the history.  Subjective    Dorothy Erickson is a 61 y.o. female. she is here today for follow-up.    History of Present Illness  61-year-old female presents to discuss screening colonoscopy.  She denies any abdominal pain nausea vomiting or changes in her bowel habits.  She has family history of colorectal cancer in her mother who was diagnosed in her 60s.  Prior colonoscopy in 2019 noted adenomatous colonic polyp of ascending colon with repeat recommended in 3 years for surveillance.  Plan; schedule patient for screening colonoscopy due to history of adenomatous colonic polyp of ascending colon and family history of colon cancer in her mother       The following portions of the patient's history were reviewed and updated as appropriate:   Past Medical History:   Diagnosis Date   • Hypertension    • Obesity (BMI 30.0-34.9) 2017     Past Surgical History:   Procedure Laterality Date   • COLONOSCOPY N/A 2019    Procedure: COLONOSCOPY Friday;  Surgeon: Sabas Fang MD;  Location: [facility] ENDOSCOPY;  Service: Gastroenterology   • CYST REMOVAL     • HERNIA REPAIR     • HYSTERECTOMY       Family History   Problem Relation Age of Onset   • Colon cancer Mother      OB History        3    Para   1    Term   1            AB   2    Living   1       SAB   2    IAB        Ectopic        Molar        Multiple        Live Births                  Prior to Admission medications    Medication Sig Start Date End Date Taking? Authorizing Provider   amLODIPine-benazepril (LOTREL 5-10) 5-10 MG per capsule Take 1 capsule by mouth Daily.    Provider, Historical, MD     Allergies   Allergen Reactions   • Contrast Dye Swelling     Social History     Socioeconomic History   • Marital status: Single   Tobacco Use   • Smoking status: Never Smoker   • Smokeless tobacco: Never Used   Vaping Use   • Vaping Use: Never used   Substance and  "Sexual Activity   • Alcohol use: Yes     Comment: occassional 1-3  drinks per week   • Drug use: No   • Sexual activity: Never     Birth control/protection: None     Comment: Hysterectomy       Review of Systems  Review of Systems   Constitutional: Negative for activity change, appetite change, chills, diaphoresis, fatigue, fever and unexpected weight change.   HENT: Negative for sore throat and trouble swallowing.    Respiratory: Negative for shortness of breath.    Gastrointestinal: Negative for abdominal distention, abdominal pain, anal bleeding, blood in stool, constipation, diarrhea, nausea, rectal pain and vomiting.   Musculoskeletal: Negative for arthralgias.   Skin: Negative for pallor.   Neurological: Negative for light-headedness.        /92 (BP Location: Left arm, Patient Position: Lying)   Pulse 74   Temp 97.3 °F (36.3 °C) (Temporal)   Resp 18   Ht 162.6 cm (64\")   Wt 91.2 kg (201 lb)   LMP  (LMP Unknown) Comment: 1993  SpO2 99%   BMI 34.50 kg/m²     Objective    Physical Exam  Constitutional:       General: She is not in acute distress.     Appearance: Normal appearance. She is normal weight. She is not ill-appearing.   HENT:      Head: Normocephalic and atraumatic.   Pulmonary:      Effort: Pulmonary effort is normal.   Abdominal:      General: Abdomen is flat. Bowel sounds are normal. There is no distension.      Palpations: Abdomen is soft. There is no mass.      Tenderness: There is no abdominal tenderness.   Neurological:      Mental Status: She is alert.       Admission on 06/07/2019, Discharged on 06/07/2019   Component Date Value Ref Range Status   • Case Report 06/07/2019    Final                    Value:Surgical Pathology Report                         Case: [id number]                                  Authorizing Provider:  Sabas Fang MD        Collected:           06/07/2019 05:10 PM          Ordering Location:     [facility]             Received:            " 06/08/2019 08:34 AM                                 [place] [facility]                                                     Pathologist:           Bill Landa MD                                                         Specimen:    Large Intestine, Right / Ascending Colon, polyp                                           • Final Diagnosis 06/07/2019    Final                    Value:This result contains rich text formatting which cannot be displayed here.   • Gross Description 06/07/2019    Final                    Value:This result contains rich text formatting which cannot be displayed here.     Assessment & Plan      1. FH: colon cancer    2. Encounter for colonoscopy due to history of adenomatous colonic polyps    .       Orders placed during this encounter include:  Orders Placed This Encounter   Procedures   • Obtain Informed Consent     Standing Status:   Standing     Number of Occurrences:   1     Order Specific Question:   Informed Consent Given For     Answer:   Colonoscopy   • POC Glucose Once     Prior to Procedure on ALL Diabetic Patients     Standing Status:   Standing     Number of Occurrences:   1   • Insert Peripheral IV     Standing Status:   Standing     Number of Occurrences:   1       COLONOSCOPY (N/A)    Review and/or summary of lab tests, radiology, procedures, medications. Review and summary of old records and obtaining of history. The risks and benefits of my recommendations, as well as other treatment options were discussed with the patient today. Questions were answered.    New Medications Ordered This Visit   Medications   • dextrose 5 % and sodium chloride 0.45 % infusion       Follow-up: No follow-ups on file.          This document has been electronically signed by Sabas Fang MD on September 9, 2022 09:32 CDT           I spent 7 minutes caring for Dorothy on this date of service. This time includes time spent by me in the following activities:preparing for the visit,  "reviewing tests, obtaining and/or reviewing a separately obtained history, performing a medically appropriate examination and/or evaluation , counseling and educating the patient/family/caregiver, ordering medications, tests, or procedures, referring and communicating with other health care professionals , documenting information in the medical record and care coordination    Results for orders placed or performed during the hospital encounter of 06/07/19   Tissue Pathology Exam    Specimen: Large Intestine, Right / Ascending Colon; Polyp   Result Value Ref Range    Case Report       Surgical Pathology Report                         Case: [id number]                                  Authorizing Provider:  Sabas Fang MD        Collected:           06/07/2019 05:10 PM          Ordering Location:     [facility]             Received:            06/08/2019 08:34 AM                                 [place] [facility]                                                     Pathologist:           Bill Landa MD                                                         Specimen:    Large Intestine, Right / Ascending Colon, polyp                                            Final Diagnosis       POLYP, ASCENDING COLON:   TUBULAR ADENOMA.       Gross Description       The container is labeled \"polyp, ascending colon\" and has nodular bits of brown white material 0.5 cc in aggregate. The entire specimen is embedded as 1A.     Results for orders placed or performed in visit on 11/29/17   HPV DNA Probe, Direct - ThinPrep Vial, Vagina    Specimen: Vagina; ThinPrep Vial   Result Value Ref Range    HPV Aptima Negative Negative   Liquid-based Pap Smear, Screening - ThinPrep Vial, Cervix    Specimen: Vagina; ThinPrep Vial   Result Value Ref Range    Case Report       Gynecologic Cytology Report                       [id number]: [id number]                                  Authorizing Provider:  Sabas Matthews MD           " Collected:           11/29/2017 01:33 PM          Ordering Location:     [facility]     Received:            11/29/2017 01:33 PM                                 [facility]                                                                 First Screen:          Ligia Vides                                                              Specimen:    Liquid-Based Pap, Screening, Vagina                                                        Interpretation Negative for intraepithelial lesion or malignancy      Specimen Adequacy Satisfactory for evaluation     Additional Information       Disclaimer: Cervical cytology is a screening test primarily for squamous cancer and its precursors and has associated false-negative and false-positive results.  Technologies such as liquid-based preparations may decrease but will not eliminate all false-negative results.  Follow-up of unexplained clinical signs and symptoms is recommended to minimize false-negative results. (The [place] System for Reporting Cervical Cytology: [place]otts, 2015).      Embedded Images

## 2022-09-09 NOTE — ANESTHESIA POSTPROCEDURE EVALUATION
Patient: Dorothy Erickson    Procedure Summary     Date: 09/09/22 Room / Location: [facility] ENDOSCOPY 1 / [facility] ENDOSCOPY    Anesthesia Start: 0938 Anesthesia Stop: 0948    Procedure: COLONOSCOPY (N/A ) Diagnosis:       FH: colon cancer      Encounter for colonoscopy due to history of adenomatous colonic polyps      (FH: colon cancer [Z80.0])      (Encounter for colonoscopy due to history of adenomatous colonic polyps [Z12.11, Z86.010])    Surgeons: Sabas Fang MD Provider: Tony Reid CRNA    Anesthesia Type: MAC ASA Status: 2          Anesthesia Type: MAC    Vitals  No vitals data found for the desired time range.          Post Anesthesia Care and Evaluation    Patient location during evaluation: bedside  Patient participation: complete - patient participated  Level of consciousness: awake and awake and alert  Pain score: 0  Pain management: adequate    Airway patency: patent  Anesthetic complications: No anesthetic complications  PONV Status: none  Cardiovascular status: acceptable and stable  Respiratory status: acceptable, room air and spontaneous ventilation  Hydration status: acceptable    Comments: BP:  151/73  HR:  69  SAT:  99  RR:  22  TEMP: 97.3

## 2022-09-09 NOTE — ANESTHESIA PREPROCEDURE EVALUATION
Anesthesia Evaluation     Patient summary reviewed and Nursing notes reviewed   NPO Solid Status: > 8 hours  NPO Liquid Status: > 2 hours           Airway   Mallampati: II  TM distance: >3 FB  Neck ROM: full  No difficulty expected  Dental - normal exam     Pulmonary - negative pulmonary ROS and normal exam   Cardiovascular - normal exam    (+) hypertension well controlled less than 2 medications,       Neuro/Psych- negative ROS  GI/Hepatic/Renal/Endo    (+) obesity,       Musculoskeletal (-) negative ROS    Abdominal  - normal exam   Substance History   (+) alcohol use,      OB/GYN negative ob/gyn ROS   (-)  Pregnant        Other - negative ROS                         Anesthesia Plan    ASA 2     MAC     intravenous induction     Anesthetic plan, risks, benefits, and alternatives have been provided, discussed and informed consent has been obtained with: patient.

## (undated) DEVICE — TRAP SXN POLYP QUICKCATCH LF

## (undated) DEVICE — Device: Brand: DISPOSABLE ELECTROSURGICAL SNARE

## (undated) DEVICE — SINGLE-USE BIOPSY FORCEPS: Brand: RADIAL JAW 4

## (undated) DEVICE — CANN SMPL SOFTECH BIFLO ETCO2 A/M 7FT